# Patient Record
Sex: MALE | Race: WHITE | URBAN - METROPOLITAN AREA
[De-identification: names, ages, dates, MRNs, and addresses within clinical notes are randomized per-mention and may not be internally consistent; named-entity substitution may affect disease eponyms.]

---

## 2017-09-14 ENCOUNTER — IMPORTED ENCOUNTER (OUTPATIENT)
Dept: URBAN - METROPOLITAN AREA CLINIC 50 | Facility: CLINIC | Age: 82
End: 2017-09-14

## 2017-09-26 ENCOUNTER — IMPORTED ENCOUNTER (OUTPATIENT)
Dept: URBAN - METROPOLITAN AREA CLINIC 50 | Facility: CLINIC | Age: 82
End: 2017-09-26

## 2017-12-27 ENCOUNTER — IMPORTED ENCOUNTER (OUTPATIENT)
Dept: URBAN - METROPOLITAN AREA CLINIC 50 | Facility: CLINIC | Age: 82
End: 2017-12-27

## 2018-07-17 ENCOUNTER — IMPORTED ENCOUNTER (OUTPATIENT)
Dept: URBAN - METROPOLITAN AREA CLINIC 50 | Facility: CLINIC | Age: 83
End: 2018-07-17

## 2021-04-18 ASSESSMENT — TONOMETRY
OS_IOP_MMHG: 17
OD_IOP_MMHG: 17

## 2021-04-18 ASSESSMENT — VISUAL ACUITY
OD_CC: 20/70
OS_OTHER: 20/30. 20/30.
OS_CC: 20/30
OD_OTHER: 20/70. 20/80.
OS_BAT: 20/30
OS_CC: J2
OD_CC: J2
OD_BAT: 20/70

## 2021-06-29 NOTE — PATIENT DISCUSSION
CHOROIDAL NEVUS, OD: SMALL, PERIPHERAL, FLAT. NO CONCERNING FEATURES. PRESCRIBED UV PROTECTION TO MINIMIZE UV EXPOSURE. RETURN FOR FOLLOW-UP AS SCHEDULED.